# Patient Record
Sex: FEMALE | Race: WHITE | NOT HISPANIC OR LATINO | Employment: OTHER | URBAN - METROPOLITAN AREA
[De-identification: names, ages, dates, MRNs, and addresses within clinical notes are randomized per-mention and may not be internally consistent; named-entity substitution may affect disease eponyms.]

---

## 2019-03-21 ENCOUNTER — RECORDS - HEALTHEAST (OUTPATIENT)
Dept: LAB | Facility: CLINIC | Age: 64
End: 2019-03-21

## 2019-03-22 LAB
ALBUMIN SERPL-MCNC: 3.4 G/DL (ref 3.5–5)
ALP SERPL-CCNC: 103 U/L (ref 45–120)
ALT SERPL W P-5'-P-CCNC: 15 U/L (ref 0–45)
ANION GAP SERPL CALCULATED.3IONS-SCNC: 10 MMOL/L (ref 5–18)
AST SERPL W P-5'-P-CCNC: 13 U/L (ref 0–40)
BILIRUB SERPL-MCNC: 0.3 MG/DL (ref 0–1)
BUN SERPL-MCNC: 14 MG/DL (ref 8–22)
CALCIUM SERPL-MCNC: 8.9 MG/DL (ref 8.5–10.5)
CHLORIDE BLD-SCNC: 104 MMOL/L (ref 98–107)
CO2 SERPL-SCNC: 24 MMOL/L (ref 22–31)
CREAT SERPL-MCNC: 0.77 MG/DL (ref 0.6–1.1)
GFR SERPL CREATININE-BSD FRML MDRD: >60 ML/MIN/1.73M2
GLUCOSE BLD-MCNC: 121 MG/DL (ref 70–125)
POTASSIUM BLD-SCNC: 3.7 MMOL/L (ref 3.5–5)
PROT SERPL-MCNC: 6.5 G/DL (ref 6–8)
SODIUM SERPL-SCNC: 138 MMOL/L (ref 136–145)

## 2020-03-17 ENCOUNTER — ANCILLARY PROCEDURE (OUTPATIENT)
Dept: GENERAL RADIOLOGY | Facility: CLINIC | Age: 65
End: 2020-03-17
Attending: PHYSICIAN ASSISTANT
Payer: COMMERCIAL

## 2020-03-17 ENCOUNTER — OFFICE VISIT (OUTPATIENT)
Dept: URGENT CARE | Facility: URGENT CARE | Age: 65
End: 2020-03-17
Payer: COMMERCIAL

## 2020-03-17 VITALS
SYSTOLIC BLOOD PRESSURE: 164 MMHG | OXYGEN SATURATION: 97 % | TEMPERATURE: 97 F | HEART RATE: 76 BPM | WEIGHT: 246 LBS | DIASTOLIC BLOOD PRESSURE: 96 MMHG

## 2020-03-17 DIAGNOSIS — R07.1 CHEST PAIN ON BREATHING: ICD-10-CM

## 2020-03-17 DIAGNOSIS — R07.1 CHEST PAIN ON BREATHING: Primary | ICD-10-CM

## 2020-03-17 PROCEDURE — 71046 X-RAY EXAM CHEST 2 VIEWS: CPT

## 2020-03-17 PROCEDURE — 99205 OFFICE O/P NEW HI 60 MIN: CPT | Performed by: PHYSICIAN ASSISTANT

## 2020-03-17 PROCEDURE — 93000 ELECTROCARDIOGRAM COMPLETE: CPT | Performed by: PHYSICIAN ASSISTANT

## 2020-03-18 NOTE — PROGRESS NOTES
SUBJECTIVE:  Serene Carcamo is a 64 year old female who presents to the office with the CC of chest pain.  Patient complains of chest pain and shortness of breath for 2 days.  The pain is characterized as moderate and localized pressure and stabbing located left chest with radiation to none. Symptoms began 2 day(s) ago, still present  Pain is associated with SOB.  Pain is exacerbated by no known provoking events.  Pain is relieved by none.  Cardiac risk factors: hypertension, history of angina    Albuterol and ibuprofen are not helping    History of asthma, pleurisy, angina    Family history positive for asthma, CV disease    Nonsmoker       Allergies   Allergen Reactions     Amitriptyline          ROS:  10 point ROS negative except as listed above      EXAM:  BP (!) 164/96   Pulse 76   Temp 97  F (36.1  C)   Wt 111.6 kg (246 lb)   SpO2 97%   GENERAL APPEARANCE: healthy, alert and no distress  EYES: conjunctiva clear  HENT: no cyanosis in lips  NECK: supple, nontender, no lymphadenopathy  RESP: lungs clear to auscultation - no rales, rhonchi or wheezes  CV: regular rates and rhythm, normal S1 S2, no murmur noted  NEURO: Normal strength and tone, sensory exam grossly normal,  normal speech and mentation  SKIN: no suspicious lesions or rashes     EKG: SINUS    CXR: X-ray image initially interpreted in clinic by me in order to rule out pneumonia.  No evidence appreciated.    IMPRESSION  (R07.1) Chest pain on breathing  (primary encounter diagnosis)  Comment: history of angina and pleurisy.  No regular doctor  Plan: EKG 12-lead complete w/read - Clinics, XR Chest        2 Views    PT DECLINES AMBULANCE AMA    SENT TO ER BY PRIVATE VEHICLE DRIVEN BY SISTER

## 2020-03-19 ENCOUNTER — COMMUNICATION - HEALTHEAST (OUTPATIENT)
Dept: CARDIOLOGY | Facility: CLINIC | Age: 65
End: 2020-03-19

## 2020-03-20 ENCOUNTER — OFFICE VISIT - HEALTHEAST (OUTPATIENT)
Dept: CARDIOLOGY | Facility: CLINIC | Age: 65
End: 2020-03-20

## 2020-03-20 DIAGNOSIS — R07.9 CHEST PAIN, UNSPECIFIED TYPE: ICD-10-CM

## 2020-03-20 DIAGNOSIS — I10 ESSENTIAL HYPERTENSION: ICD-10-CM

## 2021-05-25 ENCOUNTER — RECORDS - HEALTHEAST (OUTPATIENT)
Dept: ADMINISTRATIVE | Facility: CLINIC | Age: 66
End: 2021-05-25

## 2021-05-27 ENCOUNTER — RECORDS - HEALTHEAST (OUTPATIENT)
Dept: ADMINISTRATIVE | Facility: CLINIC | Age: 66
End: 2021-05-27

## 2021-05-30 ENCOUNTER — RECORDS - HEALTHEAST (OUTPATIENT)
Dept: ADMINISTRATIVE | Facility: CLINIC | Age: 66
End: 2021-05-30

## 2021-06-04 VITALS — DIASTOLIC BLOOD PRESSURE: 78 MMHG | BODY MASS INDEX: 42.43 KG/M2 | WEIGHT: 232 LBS | SYSTOLIC BLOOD PRESSURE: 168 MMHG

## 2021-06-07 NOTE — PROGRESS NOTES
Review of Systems - History obtained from the patient  General ROS: positive for  - weight loss  Psychological ROS: positive for - depression and anxiety   Ophthalmic ROS: negative  ENT ROS: negative  Allergy and Immunology ROS: negative  Hematological and Lymphatic ROS: negative  Endocrine ROS: negative  Respiratory ROS: positive for - shortness of breath  Cardiovascular ROS: negative  Gastrointestinal ROS: negative  Genito-Urinary ROS: negative  Musculoskeletal ROS: negative  Neurological ROS: negative  Dermatological ROS: negative  Per patient the hypertension is new for her.

## 2021-06-07 NOTE — PATIENT INSTRUCTIONS - HE
1.  Begin Maxide 1 tablet daily    2.  Follow-up with primary physician in 1 week for laboratory studies and recheck of blood pressure.

## 2021-06-07 NOTE — TELEPHONE ENCOUNTER
Wellness Screening Tool  Symptom Screening:  Do you have a:    Fever?  No    Cough?  Yes started yesterday.     Shortness of breath?  Yes   o If yes, is this a change? No    Skin rash?  No  Within the past 14 days, have you been in contact with someone who:    Is currently being ruled out for COVID-19 (novel coronavirus)?  No    Has tested positive for COVID-19?  No    Has symptoms of a respiratory illness (fever, cough, shortness of breath)?  No  Have you recently traveled to an area with COVID-19 areas:    Refer to the CDC Coronavirus webpage for COVID-19 areas:  No  Within the past 3 weeks, have you been exposed to the following:    Pertussis?  No    Chicken pox?  No    Measles? No  Patient's appointment status: patient will be seen in clinic as scheduled  Patient reminded that no visitors are allowed at this time and if their appointment is at [facility], there is no  serviced offered due to COVID-19 concerns.

## 2021-06-28 NOTE — PROGRESS NOTES
"Progress Notes by Evi Merida MD at 3/20/2020  4:20 PM     Author: Evi Merida MD Service: -- Author Type: Physician    Filed: 3/20/2020  4:18 PM Encounter Date: 3/20/2020 Status: Signed    : Evi Merida MD (Physician)               The patient has been notified of following:     \"This telephone visit will be conducted via a call between you and your physician/provider. We have found that certain health care needs can be provided without the need for a physical exam.  This service lets us provide the care you need with a phone conversation.  If a prescription is necessary we can send it directly to your pharmacy.  If lab work is needed we can place an order for that and you can then stop by our lab to have the test done at a later time. If during the course of the call the physician/provider feels a telephone visit is not appropriate, you will not be charged for this service.\" Verbal consent has been obtained for this service by care team member:         HEART CARE PHONE ENCOUNTER        The patient has chosen to have the visit conducted as a telephone visit, to reduce risk of exposure given the current status of Coronavirus in our community. This telephone visit is being conducted via a call between the patient and physician/provider. Health care needs are being provided without a physical exam.     Assessment/Recommendations   Assessment:    1. Chest discomfort, possibly related to elevated blood pressure/stress as the patient has lost 7 family members or friends over the last 6 months.  She also is likely going to be out of work due to all the issues going on currently.  She did take 1 of her ex-'s sublingual nitroglycerin after leaving the ER with some improvement in chest discomfort as well as improvement in blood pressure.  I did suggest beginning low-dose diuretic therapy to see if this helps with her blood pressure.  Also recommended a stress test; however, the patient states she would not " be able to afford it currently.  Did strongly encourage her to follow-up with her primary physician.    Plan:  1.  Patient to begin Maxide triamterene 75/50 mg daily.  2.  Advised follow-up with primary physician in 1 week to check electrolytes and blood pressure  3.  Consider pharmacologic nuclear stress test although the patient states she would not be able to afford it.      Follow Up Plan: Follow up in   I have reviewed the note as documented.  This accurately captures the substance of my conversation with the patient.    Total time of call between patient and provider was 22 minutes   Start Time:1550  Stop Time:1612       History of Present Illness/Subjective    Serene Carcamo is a 64 y.o. female who is being evaluated via a billable telephone visit.      This patient is a 64-year-old female with history of asthma but no prior history of cardiac disease who was seen in the ED recently for evaluation of chest discomfort.  Initially felt her symptoms were most likely due to pleurisy which she has had in the past although they noted her blood pressure to be quite elevated at the time of arrival.  She does admit to being under significant amount of stress having recently lost 7 members of her family or close friends over the last 6 months.  In talking about it, she became quite tearful and thinks this may be the reason her blood pressure is running high as she normally has a blood pressure of 120/70.  She also tells me she will be losing her job after today because of the issues going on currently.  She is also in the process of moving to [place].  No prior history of exertional chest discomfort.  She does admit to some mild exertional dyspnea which she attributes to her weight.  No history of orthopnea, PND or lower extremity edema.  She did have a troponin and ECG which were negative.  Chest x-ray demonstrated no acute pulmonary process.  Because of elevated d-dimer, CT PE run was performed which was negative  for pulmonary emboli but suggested possible mild heart failure.  She was subsequently discharged to be seen in rapid access clinic.    I have reviewed and updated the patient's Past Medical History, Social History, Family History and Medication List.     Physical Examination not performed given phone encounter Review of Systems             Please see end of note.                                   Medical History  Surgical History Family History Social History   Past Medical History:   Diagnosis Date   ? Asthma     Past Surgical History:   Procedure Laterality Date   ?  SECTION     ? CHOLECYSTECTOMY     ? ME TOTAL ABDOM HYSTERECTOMY      Description: Total Abdominal Hysterectomy;  Recorded: 2013;   ? SKIN CANCER EXCISION      Family History   Problem Relation Age of Onset   ? Stroke Sister    ? Heart disease Father 87   ? Cancer Mother         unknown primary   ? Asthma Brother    - CAD           Brother 69  [place]           Sister Social History     Socioeconomic History   ? Marital status:      Spouse name: Not on file   ? Number of children: Not on file   ? Years of education: Not on file   ? Highest education level: Not on file   Occupational History   ? Not on file   Social Needs   ? Financial resource strain: Not on file   ? Food insecurity     Worry: Not on file     Inability: Not on file   ? Transportation needs     Medical: Not on file     Non-medical: Not on file   Tobacco Use   ? Smoking status: Never Smoker   ? Smokeless tobacco: Never Used   Substance and Sexual Activity   ? Alcohol use: No   ? Drug use: No   ? Sexual activity: Not Currently   Lifestyle   ? Physical activity     Days per week: Not on file     Minutes per session: Not on file   ? Stress: Not on file   Relationships   ? Social connections     Talks on phone: Not on file     Gets together: Not on file     Attends [employer] service: Not on file     Active member of club or organization: Not on file     Attends meetings  of clubs or organizations: Not on file     Relationship status: Not on file   ? Intimate partner violence     Fear of current or ex partner: Not on file     Emotionally abused: Not on file     Physically abused: Not on file     Forced sexual activity: Not on file   Other Topics Concern   ? Not on file   Social History Narrative    ** Merged History Encounter **               Medications  Allergies   Current Outpatient Medications   Medication Sig Dispense Refill   ? acetaminophen (TYLENOL) 500 MG tablet Take 1,000 mg by mouth every 6 (six) hours as needed for pain.     ? albuterol (PROVENTIL HFA;VENTOLIN HFA) 90 mcg/actuation inhaler Inhale 2 puffs every 6 (six) hours as needed for wheezing.     ? aspirin 325 MG tablet Take 325 mg by mouth daily.      ? naproxen sodium (ALEVE) 220 MG tablet Take 220 mg by mouth as needed for pain.       No current facility-administered medications for this visit.     Allergies   Allergen Reactions   ? Amitriptyline      OtherAnnotation: Unsure of reaction, was given when she was young     ? Codeine Nausea And Vomiting   ? Codeine Nausea Only         Lab Results    Chemistry/lipid CBC Cardiac Enzymes/BNP/TSH/INR   Lab Results   Component Value Date    CREATININE 0.72 03/17/2020    BUN 19 03/17/2020    K 3.8 03/17/2020     03/17/2020     (H) 03/17/2020    CO2 26 03/17/2020    Lab Results   Component Value Date    WBC 10.0 03/17/2020    HGB 10.3 (L) 03/17/2020    HCT 33.3 (L) 03/17/2020    MCV 81 03/17/2020     03/17/2020    Lab Results   Component Value Date    CKMB 1 01/28/2013    TROPONINI <0.01 03/17/2020     (H) 12/25/2014    TSH 1.99 12/26/2014    INR 0.96 03/03/2017        Evi Merida        Review of Systems - History obtained from the patient  General ROS: positive for  - weight loss  Psychological ROS: positive for - depression and anxiety   Ophthalmic ROS: negative  ENT ROS: negative  Allergy and Immunology ROS: negative  Hematological and Lymphatic  ROS: negative  Endocrine ROS: negative  Respiratory ROS: positive for - shortness of breath  Cardiovascular ROS: negative  Gastrointestinal ROS: negative  Genito-Urinary ROS: negative  Musculoskeletal ROS: negative  Neurological ROS: negative  Dermatological ROS: negative  Per patient the hypertension is new for her.

## 2021-07-25 ENCOUNTER — HEALTH MAINTENANCE LETTER (OUTPATIENT)
Age: 66
End: 2021-07-25

## 2021-09-19 ENCOUNTER — HEALTH MAINTENANCE LETTER (OUTPATIENT)
Age: 66
End: 2021-09-19

## 2022-08-21 ENCOUNTER — HEALTH MAINTENANCE LETTER (OUTPATIENT)
Age: 67
End: 2022-08-21

## 2022-09-12 ENCOUNTER — HOSPITAL ENCOUNTER (EMERGENCY)
Facility: HOSPITAL | Age: 67
Discharge: HOME | End: 2022-09-12
Attending: EMERGENCY MEDICINE
Payer: MEDICARE

## 2022-09-12 ENCOUNTER — APPOINTMENT (OUTPATIENT)
Dept: CT IMAGING | Facility: HOSPITAL | Age: 67
End: 2022-09-12
Payer: MEDICARE

## 2022-09-12 VITALS
HEIGHT: 62 IN | OXYGEN SATURATION: 98 % | TEMPERATURE: 97.7 F | SYSTOLIC BLOOD PRESSURE: 151 MMHG | HEART RATE: 70 BPM | WEIGHT: 239.64 LBS | BODY MASS INDEX: 44.1 KG/M2 | DIASTOLIC BLOOD PRESSURE: 75 MMHG | RESPIRATION RATE: 12 BRPM

## 2022-09-12 DIAGNOSIS — J98.01 BRONCHOSPASM: ICD-10-CM

## 2022-09-12 DIAGNOSIS — Z87.09 HISTORY OF ASTHMA: ICD-10-CM

## 2022-09-12 DIAGNOSIS — R05.9 COUGH: Primary | ICD-10-CM

## 2022-09-12 LAB
ALBUMIN SERPL-MCNC: 4.4 G/DL (ref 3.5–5.3)
ALP SERPL-CCNC: 121 U/L (ref 55–142)
ALT SERPL-CCNC: 16 U/L (ref 7–52)
ANION GAP SERPL CALC-SCNC: 9 MMOL/L (ref 3–11)
AST SERPL-CCNC: 17 U/L
B PARAP IS1001 DNA NPH QL NAA+NON-PROBE: NEGATIVE
B PERT.PT PRMT NPH QL NAA+NON-PROBE: NEGATIVE
BASOPHILS # BLD AUTO: 0.1 10*3/UL
BASOPHILS NFR BLD AUTO: 0.7 % (ref 0–2)
BILIRUB SERPL-MCNC: 0.35 MG/DL (ref 0.2–1.4)
BNP SERPL-MCNC: 36 PG/ML (ref 0–100)
BUN SERPL-MCNC: 22 MG/DL (ref 7–25)
C PNEUM DNA NPH QL NAA+NON-PROBE: NEGATIVE
CALCIUM ALBUM COR SERPL-MCNC: 9.5 MG/DL (ref 8.6–10.3)
CALCIUM SERPL-MCNC: 9.8 MG/DL (ref 8.6–10.3)
CHLORIDE SERPL-SCNC: 104 MMOL/L (ref 98–107)
CO2 SERPL-SCNC: 27 MMOL/L (ref 21–32)
CREAT SERPL-MCNC: 0.9 MG/DL (ref 0.6–1.1)
DELTA HIGH SENSITIVITY TROPONIN I, 1 HOUR: -0.1
DELTA HIGH SENSITIVITY TROPONIN I, 2 HOUR: 0.5
EOSINOPHIL # BLD AUTO: 0.1 10*3/UL
EOSINOPHIL NFR BLD AUTO: 1.4 % (ref 0–3)
ERYTHROCYTE [DISTWIDTH] IN BLOOD BY AUTOMATED COUNT: 16.3 % (ref 11.5–14)
FLUAV RNA NPH QL NAA+NON-PROBE: NEGATIVE
FLUBV RNA NPH QL NAA+NON-PROBE: NEGATIVE
GFR SERPL CREATININE-BSD FRML MDRD: 71 ML/MIN/1.73M*2
GLUCOSE SERPL-MCNC: 97 MG/DL (ref 70–105)
HADV DNA NPH QL NAA+NON-PROBE: NEGATIVE
HCOV 229E RNA NPH QL NAA+NON-PROBE: NEGATIVE
HCOV HKU1 RNA NPH QL NAA+NON-PROBE: NEGATIVE
HCOV NL63 RNA NPH QL NAA+NON-PROBE: NEGATIVE
HCOV OC43 RNA NPH QL NAA+NON-PROBE: NEGATIVE
HCT VFR BLD AUTO: 33.9 % (ref 34–45)
HGB BLD-MCNC: 11.1 G/DL (ref 11.5–15.5)
HMPV RNA NPH QL NAA+NON-PROBE: NEGATIVE
HPIV1 RNA NPH QL NAA+NON-PROBE: NEGATIVE
HPIV2 RNA NPH QL NAA+NON-PROBE: NEGATIVE
HPIV3 RNA NPH QL NAA+NON-PROBE: NEGATIVE
HPIV4 RNA NPH QL NAA+NON-PROBE: NEGATIVE
HYPOCHROMIA PRESENCE IN BLOOD, ANALYZER: ABNORMAL
LYMPHOCYTES # BLD AUTO: 1.8 10*3/UL
LYMPHOCYTES NFR BLD AUTO: 20.4 % (ref 11–47)
M PNEUMO DNA NPH QL NAA+NON-PROBE: NEGATIVE
MCH RBC QN AUTO: 26.2 PG (ref 28–33)
MCHC RBC AUTO-ENTMCNC: 32.7 G/DL (ref 32–36)
MCV RBC AUTO: 80.2 FL (ref 81–97)
MONOCYTES # BLD AUTO: 0.6 10*3/UL
MONOCYTES NFR BLD AUTO: 6.4 % (ref 3–11)
NEUTROPHILS # BLD AUTO: 6.2 10*3/UL
NEUTROPHILS NFR BLD AUTO: 71.1 % (ref 41–81)
PLATELET # BLD AUTO: 317 10*3/UL (ref 140–350)
PMV BLD AUTO: 9.3 FL (ref 6.9–10.8)
POTASSIUM SERPL-SCNC: 3.9 MMOL/L (ref 3.5–5.1)
PROT SERPL-MCNC: 7.6 G/DL (ref 6–8.3)
RBC # BLD AUTO: 4.22 10*6/ΜL (ref 3.7–5.3)
RSV RNA NPH QL NAA+NON-PROBE: NEGATIVE
RV+EV RNA NPH QL NAA+NON-PROBE: NEGATIVE
SARS-COV-2 RNA NPH QL NAA+NON-PROBE: NEGATIVE
SODIUM SERPL-SCNC: 140 MMOL/L (ref 135–145)
TROPONIN I SERPL-MCNC: 2.5 PG/ML
TROPONIN I SERPL-MCNC: 2.6 PG/ML
TROPONIN I SERPL-MCNC: 3.1 PG/ML
WBC # BLD AUTO: 8.7 10*3/UL (ref 4.5–10.5)

## 2022-09-12 PROCEDURE — 87633 RESP VIRUS 12-25 TARGETS: CPT | Performed by: NURSE PRACTITIONER

## 2022-09-12 PROCEDURE — 85025 COMPLETE CBC W/AUTO DIFF WBC: CPT | Performed by: NURSE PRACTITIONER

## 2022-09-12 PROCEDURE — 84484 ASSAY OF TROPONIN QUANT: CPT | Performed by: NURSE PRACTITIONER

## 2022-09-12 PROCEDURE — 93005 ELECTROCARDIOGRAM TRACING: CPT | Performed by: NURSE PRACTITIONER

## 2022-09-12 PROCEDURE — 71275 CT ANGIOGRAPHY CHEST: CPT | Mod: ME

## 2022-09-12 PROCEDURE — 83880 ASSAY OF NATRIURETIC PEPTIDE: CPT | Performed by: NURSE PRACTITIONER

## 2022-09-12 PROCEDURE — 6370000100 HC RX 637 (ALT 250 FOR IP): Performed by: NURSE PRACTITIONER

## 2022-09-12 PROCEDURE — 2550000100 HC RX 255: Performed by: NURSE PRACTITIONER

## 2022-09-12 PROCEDURE — 2580000300 HC RX 258: Performed by: NURSE PRACTITIONER

## 2022-09-12 PROCEDURE — 99285 EMERGENCY DEPT VISIT HI MDM: CPT | Performed by: EMERGENCY MEDICINE

## 2022-09-12 PROCEDURE — 80053 COMPREHEN METABOLIC PANEL: CPT | Performed by: NURSE PRACTITIONER

## 2022-09-12 PROCEDURE — 36415 COLL VENOUS BLD VENIPUNCTURE: CPT | Performed by: EMERGENCY MEDICINE

## 2022-09-12 RX ORDER — SODIUM CHLORIDE 9 MG/ML
500 INJECTION, SOLUTION INTRAVENOUS ONCE
Status: COMPLETED | OUTPATIENT
Start: 2022-09-12 | End: 2022-09-12

## 2022-09-12 RX ORDER — SODIUM CHLORIDE 9 MG/ML
25-50 INJECTION, SOLUTION INTRAVENOUS AS NEEDED
Status: DISCONTINUED | OUTPATIENT
Start: 2022-09-12 | End: 2022-09-12 | Stop reason: HOSPADM

## 2022-09-12 RX ORDER — IOPAMIDOL 755 MG/ML
80 INJECTION, SOLUTION INTRAVASCULAR ONCE
Status: COMPLETED | OUTPATIENT
Start: 2022-09-12 | End: 2022-09-12

## 2022-09-12 RX ADMIN — SODIUM CHLORIDE 500 ML: 9 INJECTION, SOLUTION INTRAVENOUS at 11:05

## 2022-09-12 RX ADMIN — IOPAMIDOL 80 ML: 755 INJECTION, SOLUTION INTRAVENOUS at 12:05

## 2022-09-12 RX ADMIN — IPRATROPIUM BROMIDE AND ALBUTEROL 1 PUFF: 20; 100 SPRAY, METERED RESPIRATORY (INHALATION) at 13:14

## 2022-09-12 ASSESSMENT — ENCOUNTER SYMPTOMS
VOICE CHANGE: 0
BACK PAIN: 0
TROUBLE SWALLOWING: 0
VOMITING: 0
HEMATURIA: 0
ARTHRALGIAS: 0
FATIGUE: 1
PALPITATIONS: 1
ACTIVITY CHANGE: 1
ABDOMINAL PAIN: 0
WOUND: 0
DIZZINESS: 1
COUGH: 1
BRUISES/BLEEDS EASILY: 0
FEVER: 0
MYALGIAS: 0
SHORTNESS OF BREATH: 1
DYSURIA: 0
EYE PAIN: 0
SEIZURES: 0
JOINT SWELLING: 0
SORE THROAT: 0
CHILLS: 0
HEADACHES: 0
COLOR CHANGE: 0
NAUSEA: 0

## 2022-09-12 NOTE — DISCHARGE INSTRUCTIONS
No evidence of blood clot in the lungs, pneumonia, collapsed lung, fluid volume overload in the lungs electrolyte abnormality or evidence of heart ischemia.    Respiratory viral pathogen panel testing is pending for other viruses besides COVID.  If any positives we will call to notify.  Continue supportive cares including adequate hydration, adequate rest, balanced diet, albuterol inhaler 2 puffs every 4 hours as needed for shortness of breath or cough.    Combivent inhaler is being added 1 puff twice daily for the next week and then as needed or discussed with your primary care provider.

## 2022-09-12 NOTE — ED ATTESTATION NOTE
I personally saw and evaluated this patient. I discussed this management with the BRITTNEY, Annmarie Jacobo CNP, & reviewed the BRITTNEY notes and agree with the documentation. I performed the substantive portion of the history as documented by the BRITTNEY. On my encounter, patient is referred from urgent care with symptoms concerning for pulmonary embolism. Agree with nurse practitioner's presentation and plan for comprehensive cardiopulmonary evaluation including appropriate testing to rule out pulmonary embolism. Further recommendations as per BRITTNEY's plan including disposition.        ECG 12 lead    Date/Time: 9/12/2022 11:22 AM  Performed by: Agustni Daigle MD  Authorized by: Annmarie Jacobo CNP     ECG reviewed by ED Physician in the absence of a cardiologist: yes    Interpretation:     Interpretation: non-specific    Rate:     ECG rate:  60    ECG rate assessment: normal    Rhythm:     Rhythm: sinus rhythm    Ectopy:     Ectopy: PAC    QRS:     QRS axis:  Normal  ST segments:     ST segments:  Normal  T waves:     T waves: normal    Other findings:     Other findings comment:  Abnormal R wave progression      By signing my name, I, Rahat Styles attest that this documentation has been prepared under the direction and in the presence of Dr. Daigle 9/12/2022, 11:22 AM.     Agustin Daigle MD  09/12/22 [id number]

## 2022-09-12 NOTE — ED PROVIDER NOTES
HPI:  Chief Complaint   Patient presents with    Cough     Pt presents w/ reports of going to see urgent care today and was reported to have been referred here for PE rule out.       66-year-old female presents via POV from [facility] urgent care.  She presented there today after multiple episodes of shortness of breath accompanied with palpitations and extreme fatigue.  Patient is traveling via car from [place].  2 days she has had episodes of sudden onset shortness of breath accompanied with palpitations that lasted few seconds and fleeting dizziness.  She is then able to rest but becomes extremely fatigued and is forced to lie down to take a nap.  Upon waking she feels better however symptoms return with any exertion.  Does have some worsening dyspnea and orthopnea at times.  Patient has a significant history of asthma.  During 1 of these episodes home pulse oximeter was 88-89% on room air.  She is also had a cough for 2 days.  Denies any infectious symptoms, syncopal episode, nausea, vomiting, diarrhea.  Denies any lower extremity pain or edema that is new or different than chronic.  Patient does have a significant history of hypertension and is on a water pill.  Reports stress test and echocardiogram were reported to be normal approximately 1 year ago.  Patient does not smoke.  No history of DVT or PE.  Does not take any hormones.  No history of cancer.  No hemoptysis.  No other associated symptoms or other identified aggravating factors.  Patient was seen at [facility] urgent care where chest x-ray was read as negative for acute abnormality.  D-dimer was mildly elevated at 920 and referred to this emergency department for further evaluation and management.      History provided by:  Patient  Cough  Cough characteristics:  Non-productive  Associated symptoms: shortness of breath    Associated symptoms: no chest pain, no chills, no ear pain, no fever, no headaches, no myalgias, no rash and no sore throat       HISTORY:  History reviewed. No pertinent past medical history.    History reviewed. No pertinent surgical history.    History reviewed. No pertinent family history.           ROS:  Review of Systems   Constitutional:  Positive for activity change and fatigue. Negative for chills and fever.   HENT:  Positive for congestion. Negative for ear pain, sore throat, trouble swallowing and voice change.    Eyes:  Negative for pain and visual disturbance.   Respiratory:  Positive for cough and shortness of breath.    Cardiovascular:  Positive for palpitations. Negative for chest pain and leg swelling.   Gastrointestinal:  Negative for abdominal pain, nausea and vomiting.   Genitourinary:  Negative for dysuria and hematuria.   Musculoskeletal:  Negative for arthralgias, back pain, joint swelling and myalgias.   Skin:  Negative for color change, rash and wound.   Neurological:  Positive for dizziness. Negative for seizures, syncope and headaches.   Hematological:  Does not bruise/bleed easily.   All other systems reviewed and are negative.    PE:  ED Triage Vitals   Temp Heart Rate Resp BP SpO2   09/12/22 0959 09/12/22 0959 09/12/22 0959 09/12/22 0959 09/12/22 0959   36.9 °C (98.4 °F) 76 14 (!) 148/102 98 %      Mean BP (mmHg) Temp Source Heart Rate Source Patient Position BP Location   09/12/22 1007 09/12/22 1007 -- 09/12/22 0959 --   122 Oral  Sitting       FiO2 (%)       --                  Physical Exam  Vitals and nursing note reviewed.   Constitutional:       General: She is in acute distress.      Appearance: She is well-developed. She is obese. She is not ill-appearing.   HENT:      Head: Normocephalic and atraumatic.      Right Ear: Tympanic membrane normal.      Left Ear: Tympanic membrane normal.      Nose: Nose normal.      Mouth/Throat:      Mouth: Mucous membranes are moist.   Eyes:      Extraocular Movements: Extraocular movements intact.      Conjunctiva/sclera: Conjunctivae normal.   Cardiovascular:       Rate and Rhythm: Normal rate and regular rhythm.      Pulses: Normal pulses.      Heart sounds: No murmur heard.  Pulmonary:      Effort: Pulmonary effort is normal. No respiratory distress.      Breath sounds: Normal breath sounds.   Abdominal:      General: Abdomen is flat. Bowel sounds are normal.      Palpations: Abdomen is soft.      Tenderness: There is no abdominal tenderness.   Musculoskeletal:         General: Normal range of motion.      Cervical back: Normal range of motion and neck supple.   Skin:     General: Skin is warm and dry.      Capillary Refill: Capillary refill takes less than 2 seconds.   Neurological:      Mental Status: She is alert and oriented to person, place, and time.   Psychiatric:         Mood and Affect: Mood normal.       ED LABS:  Labs Reviewed   CBC WITH AUTO DIFFERENTIAL - Abnormal       Result Value    WBC 8.7      RBC 4.22      Hemoglobin 11.1 (*)     Hematocrit 33.9 (*)     MCV 80.2 (*)     MCH 26.2 (*)     MCHC 32.7      RDW 16.3 (*)     Platelets 317      MPV 9.3      Neutrophils% 71.1      Lymphocytes% 20.4      Monocytes% 6.4      Eosinophils% 1.4      Basophils% 0.7      ANC (auto diff) 6.20      Lymphocytes Absolute 1.80      Monocytes Absolute 0.60      Eosinophils Absolute 0.10      Basophils Absolute 0.10      Hypochromia 1+     COMPREHENSIVE METABOLIC PANEL - Normal    Sodium 140      Potassium 3.9      Chloride 104      CO2 27      Anion Gap 9      BUN 22      Creatinine 0.90      Glucose 97      Calcium 9.8      AST 17      ALT (SGPT) 16      Alkaline Phosphatase 121      Total Protein 7.6      Albumin 4.4      Total Bilirubin 0.35      Corrected Calcium 9.5      eGFR 71      Narrative:     Calculation based on the 2021 Chronic Kidney Disease Epidemiology Collaboration (CKD-EPI) equation refit without adjustment for race.   B-TYPE NATRIURETIC PEPTIDE (BNP) - Normal    BNP 36     HIGH SENSITIVE TROPONIN I - Normal    hsTnI 0 Hour 2.6     RESPIRATORY PATHOGEN PANEL,  PCR - Normal    Adenovirus Detection by PCR Negative      SARS-COV-2 PCR Negative      Human Metapneumovirus Detection by PCR Negative      Rhinovirus/Enterovirus Detection by PCR Negative      Influenza A  Detection by PCR Negative      Influenza B Detection by PCR Negative      Respiratory Syncytial Virus Detection by PCR Negative      Bordetella Pertussis Detection by PCR Negative      Chlamydophila Pneumoniae Detection by PCR Negative      Mycoplasma pneumo by PCR Negative      Bordetella Parapertussis Detection by PCR Negative      Coronavirus 229E Detection by PCR Negative      Coronavirus HKU1 Detection by PCR Negative      Coronavirus NL63  Detection by PCR Negative      Coronavirus OC43 Detection by PCR Negative      Parainfluenza 1 Detection by PCR Negative      Parainfluenza 2 Detection by PCR Negative      Parainfluenza 3 Detection by PCR Negative      Parainfluenza 4 Detection by PCR Negative      Narrative:     This assay is performed using the FilmArray Respiratory Panel ([employer] Results from this test must be correlated with the clinical history, epidemiological data, and other data available to the clinician evaluating the patient. A negative FilmArray RP result does not exclude the possibility of viral or bacterial infection. Negative test results may occur from the presence of sequence variants in the region targeted by the assay, the presence of inhibitors or an infection caused by an organism not detected by thepanel.   HIGH SENSITIVE TROPONIN I, 1 HOUR - Normal    hsTnI 1 hr 2.5      Delta from 0 Hour -0.1     HIGH SENSITIVE TROPONIN I, 2 HOUR - Normal    Delta from 0 Hour 0.5      hsTnI 2 hr 3.1     HIGH SENSITIVE TROPONIN I PANEL (0HR, 1HR, 2HR)    Narrative:     The following orders were created for panel order HS Troponin I Panel (0HR, 1HR, 2HR) Blood, Venous.  Procedure                               Abnormality         Status                     ---------                                -----------         ------                     HS Troponin I[[id number]]                 Normal              Final result               1HR High Sensitive Trop I[[id number]]     Normal              Final result               2HR High Sensitive Tropon...[[id number]]  Normal              Final result                 Please view results for these tests on the individual orders.   LAVENDER TOP RAINBOW   URINE EXTRA CONTAINER         ED IMAGES:  CT angiogram chest PE with IV contrast   Final Result   IMPRESSION:      Negative for pulmonary emboli or other acute abnormality.          ED PROCEDURES:  Procedures    ED COURSE:  ED Course as of 09/12/22 1911   Mon Sep 12, 2022   1210 Ambulation trial does not demonstrate hypoxia.  CT imaging negative for PE or other abnormality.  I have reviewed and agree.  BNP is negative for concern of heart failure.  Troponin is negative x2 therefore low suspicion cardiac etiology.  Patient will be discharged home with Combivent for bronchospasm to be used on a scheduled basis for the next week and then as needed.  Should follow with primary care provider upon return home to [place].  States understanding is agreeable to this plan. [AJ]      ED Course User Index  [AJ] Annmarie Jacobo CNP          Sepsis Quality Bundle         MDM:  MDM     Amount and/or Complexity of Data Reviewed  Clinical lab tests: reviewed  Tests in the radiology section of CPT®: reviewed  Tests in the medicine section of CPT®: reviewed  Independent visualization of images, tracings, or specimens: yes      66-year-old female with history of asthma presents with cough, fatigue and shortness of breath.  She was seen by urgent care where D-dimer was significantly elevated was referred to this emergency department with concern for pulmonary embolism.  Baseline labs will be repeated to rule out cardiac ischemia, heart failure, NAOMIE, respiratory  pathogen panel will be obtained to rule out viral illness.   Continuous pulse oximetry and telemetry will be monitored.  CTA will be obtained due to elevated D-dimer, recent travel dyspnea on exertion.  Symptomatic treatment will be provided.    A voice recognition program was used to aid in documentation of this record.  Sometimes words are not printed exactly as they were spoken.  While efforts were made to carefully edit and correct any inaccuracies, some areas may be present; please take these into context.  Please contact provider if areas are identified or any concerns.   Final diagnoses:   [R05.9] Cough   [J98.01] Bronchospasm   [Z87.09] History of asthma     9/12/2022  1:02 PM                   Annmarie Jacobo CNP  09/12/22 0762

## 2022-11-21 ENCOUNTER — HEALTH MAINTENANCE LETTER (OUTPATIENT)
Age: 67
End: 2022-11-21

## 2023-04-15 ENCOUNTER — HOSPITAL ENCOUNTER (EMERGENCY)
Facility: CLINIC | Age: 68
Discharge: HOME | End: 2023-04-15
Attending: EMERGENCY MEDICINE | Admitting: EMERGENCY MEDICINE
Payer: MEDICARE

## 2023-04-15 ENCOUNTER — APPOINTMENT (OUTPATIENT)
Dept: CT IMAGING | Facility: CLINIC | Age: 68
End: 2023-04-15
Attending: EMERGENCY MEDICINE
Payer: MEDICARE

## 2023-04-15 VITALS
BODY MASS INDEX: 41.13 KG/M2 | DIASTOLIC BLOOD PRESSURE: 63 MMHG | HEIGHT: 62 IN | RESPIRATION RATE: 16 BRPM | TEMPERATURE: 97.8 F | OXYGEN SATURATION: 95 % | WEIGHT: 223.5 LBS | SYSTOLIC BLOOD PRESSURE: 131 MMHG | HEART RATE: 83 BPM

## 2023-04-15 DIAGNOSIS — R10.13 ABDOMINAL PAIN, EPIGASTRIC: ICD-10-CM

## 2023-04-15 LAB
ALBUMIN SERPL-MCNC: 3.5 G/DL (ref 3.5–5)
ALP SERPL-CCNC: 98 U/L (ref 45–120)
ALT SERPL W P-5'-P-CCNC: 33 U/L (ref 0–45)
ANION GAP SERPL CALCULATED.3IONS-SCNC: 8 MMOL/L (ref 5–18)
AST SERPL W P-5'-P-CCNC: 16 U/L (ref 0–40)
BILIRUB SERPL-MCNC: 0.5 MG/DL (ref 0–1)
BUN SERPL-MCNC: 16 MG/DL (ref 8–22)
CALCIUM SERPL-MCNC: 9.2 MG/DL (ref 8.5–10.5)
CHLORIDE BLD-SCNC: 106 MMOL/L (ref 98–107)
CO2 SERPL-SCNC: 24 MMOL/L (ref 22–31)
CREAT SERPL-MCNC: 0.93 MG/DL (ref 0.6–1.1)
ERYTHROCYTE [DISTWIDTH] IN BLOOD BY AUTOMATED COUNT: 14.8 % (ref 10–15)
GFR SERPL CREATININE-BSD FRML MDRD: 67 ML/MIN/1.73M2
GLUCOSE BLD-MCNC: 126 MG/DL (ref 70–125)
HCT VFR BLD AUTO: 36 % (ref 35–47)
HGB BLD-MCNC: 11.7 G/DL (ref 11.7–15.7)
LIPASE SERPL-CCNC: 31 U/L (ref 0–52)
MCH RBC QN AUTO: 26.7 PG (ref 26.5–33)
MCHC RBC AUTO-ENTMCNC: 32.5 G/DL (ref 31.5–36.5)
MCV RBC AUTO: 82 FL (ref 78–100)
PLATELET # BLD AUTO: 334 10E3/UL (ref 150–450)
POTASSIUM BLD-SCNC: 3.5 MMOL/L (ref 3.5–5)
PROT SERPL-MCNC: 6.6 G/DL (ref 6–8)
RBC # BLD AUTO: 4.38 10E6/UL (ref 3.8–5.2)
SODIUM SERPL-SCNC: 138 MMOL/L (ref 136–145)
WBC # BLD AUTO: 9.1 10E3/UL (ref 4–11)

## 2023-04-15 PROCEDURE — 250N000011 HC RX IP 250 OP 636: Performed by: EMERGENCY MEDICINE

## 2023-04-15 PROCEDURE — 85027 COMPLETE CBC AUTOMATED: CPT | Performed by: EMERGENCY MEDICINE

## 2023-04-15 PROCEDURE — 99285 EMERGENCY DEPT VISIT HI MDM: CPT | Mod: 25

## 2023-04-15 PROCEDURE — G1010 CDSM STANSON: HCPCS

## 2023-04-15 PROCEDURE — 96375 TX/PRO/DX INJ NEW DRUG ADDON: CPT

## 2023-04-15 PROCEDURE — 83690 ASSAY OF LIPASE: CPT | Performed by: EMERGENCY MEDICINE

## 2023-04-15 PROCEDURE — 80053 COMPREHEN METABOLIC PANEL: CPT | Performed by: EMERGENCY MEDICINE

## 2023-04-15 PROCEDURE — 96374 THER/PROPH/DIAG INJ IV PUSH: CPT | Mod: 59

## 2023-04-15 PROCEDURE — 36415 COLL VENOUS BLD VENIPUNCTURE: CPT | Performed by: EMERGENCY MEDICINE

## 2023-04-15 RX ORDER — IOPAMIDOL 755 MG/ML
100 INJECTION, SOLUTION INTRAVASCULAR ONCE
Status: COMPLETED | OUTPATIENT
Start: 2023-04-15 | End: 2023-04-15

## 2023-04-15 RX ORDER — FAMOTIDINE 20 MG/1
20 TABLET, FILM COATED ORAL 2 TIMES DAILY
Qty: 40 TABLET | Refills: 0 | Status: SHIPPED | OUTPATIENT
Start: 2023-04-15

## 2023-04-15 RX ORDER — METOCLOPRAMIDE 10 MG/1
10 TABLET ORAL
Qty: 40 TABLET | Refills: 0 | Status: SHIPPED | OUTPATIENT
Start: 2023-04-15

## 2023-04-15 RX ORDER — METOCLOPRAMIDE HYDROCHLORIDE 5 MG/ML
10 INJECTION INTRAMUSCULAR; INTRAVENOUS ONCE
Status: COMPLETED | OUTPATIENT
Start: 2023-04-15 | End: 2023-04-15

## 2023-04-15 RX ADMIN — IOPAMIDOL 100 ML: 755 INJECTION, SOLUTION INTRAVENOUS at 11:00

## 2023-04-15 RX ADMIN — FAMOTIDINE 20 MG: 10 INJECTION, SOLUTION INTRAVENOUS at 10:23

## 2023-04-15 RX ADMIN — METOCLOPRAMIDE HYDROCHLORIDE 10 MG: 5 INJECTION INTRAMUSCULAR; INTRAVENOUS at 10:19

## 2023-04-15 ASSESSMENT — ACTIVITIES OF DAILY LIVING (ADL): ADLS_ACUITY_SCORE: 35

## 2023-04-15 NOTE — ED PROVIDER NOTES
EMERGENCY DEPARTMENT ENCOUNTER      NAME: Serene Carcamo  AGE: 67 year old female  YOB: 1955  MRN: [id number]  EVALUATION DATE & TIME: No admission date for patient encounter.    PCP: Rafa Garza    ED PROVIDER: Basil Mendoza M.D.      Chief Complaint   Patient presents with     Abdominal Pain         FINAL IMPRESSION:  Abdominal pain  History of gastric bypass    ED COURSE & MEDICAL DECISION MAKING:    Pertinent Labs & Imaging studies reviewed. (See chart for details)  67 year old female presents to the Emergency Department for evaluation of abdominal pain and early satiety.  Symptoms been ongoing for several days.  Had a plain films of her abdomen a few days ago and reported as normal.  Has been taking MiraLAX but does not report obvious constipation.  Patient has had prior cholecystectomy and gastric bypass surgery.  She localizes much of the discomfort in the right upper quadrant.  Describes as an achiness especially comes on after eating.  On exam she is obese female in mild distress.  Quite anxious about her symptoms.  Heart and lungs are unremarkable.  Abdomen is soft with moderate right upper quadrant tenderness.  Primary concern is potential stricture at site of anastomosis versus severe gastritis with gastric outlet obstruction issues.  We will treat symptomatically with Reglan and Pepcid.  Baseline blood work being obtained along with CT imaging.. Patient appears non toxic with stable vitals signs.     9:57 AM I met with the patient for the initial interview and physical examination. Discussed plan for treatment and workup in the ED.    11 AM.  Lipase is normal.  Comprehensive metabolic panel is normal other than insignificant elevation of glucose.  CBC unremarkable.  Awaiting CT imaging  11:30 AM.  Review of CT indicates gastric bypass.  No evidence of inflammatory process.  No evidence of obvious obstructive process.  Awaiting definitive report from radiology.  11:57 AM.   CT imaging without acute findings to explain her symptoms.  No evidence of obstructive or inflammatory process.  Patient will be placed on Reglan and Pepcid for symptomatic relief.  Recommendation follow-up primary care physician in 2 to 3 days for reevaluation if not improving.  Patient declines prescription for famotidine as she is started on Protonix a few days ago.  At the conclusion of the encounter I discussed the results of all of the tests and the disposition. The questions were answered and return precautions provided. The patient or family acknowledged understanding and was agreeable with the care plan.       PPE: Provider wore gloves, N95 mask, eye protection.     Medical Decision Making    History:    Supplemental history from: Documented in chart, if applicable    External Record(s) reviewed: Documented in chart, if applicable.    Work Up:    Chart documentation includes differential considered and any EKGs or imaging independently interpreted by provider, where specified.    In additional to work up documented, I considered the following work up: Documented in chart, if applicable.    External consultation:    Discussion of management with another provider: Documented in chart, if applicable    Complicating factors:    Care impacted by chronic illness: Other: Gastric bypass    Care affected by social determinants of health: N/A    Disposition considerations: Discharge. I prescribed additional prescription strength medication(s) as charted. I considered admission, but discharged patient after significant clinical improvement.    MEDICATIONS GIVEN IN THE EMERGENCY:  Medications - No data to display    NEW PRESCRIPTIONS STARTED AT TODAY'S ER VISIT  Current Discharge Medication List      START taking these medications    Details   famotidine (PEPCID) 20 MG tablet Take 1 tablet (20 mg) by mouth 2 times daily  Qty: 40 tablet, Refills: 0      metoclopramide (REGLAN) 10 MG tablet Take 1 tablet (10 mg) by mouth  "4 times daily (before meals and nightly)  Qty: 40 tablet, Refills: 0                =================================================================    HPI    Patient information was obtained from: Patient    Use of Intrepreter: N/A        Serene Carcamo is a 67 year old female with a pertient medical history of s/p cholecystectomy, s/p  x6, s/p hysterectomy, s/p gastric bypass who presents to the ED for evaluation of abdominal pain. For the past 2 weeks, patient endorses upper and right sided abdominal pain which she describes as a pressure sensation \"like something is pushing on it\". She also reports early satiety stating she is only able to take two bites of food before becoming full. Patient's symptoms are causing her anxiety.    She reports being previously evaluated 2 days ago for her symptoms and had an abdominal XR which she states she was told was normal. Patient notes she has heartburn with eating spicy foods, but she denies any worsening of her symptoms with eating certain foods. Patient has been taking Miralax for the past couple of weeks and endorses associated loose stools.      REVIEW OF SYSTEMS   Constitutional:  Denies fever, chills  Respiratory:  Denies productive cough or increased work of breathing  Cardiovascular:  Denies chest pain, palpitations  GI: Positive for abdominal pain (upper and right sided), early satiety.  Denies nausea, vomiting, or change in bowel or bladder habits   Musculoskeletal:  Denies any new muscle/joint swelling  Skin:  Denies rash   Neurologic:  Denies focal weakness  All systems negative except as marked.     PAST MEDICAL HISTORY:  Past Medical History:   Diagnosis Date     Asthma        PAST SURGICAL HISTORY:  Past Surgical History:   Procedure Laterality Date      SECTION       CHOLECYSTECTOMY       SKIN CANCER EXCISION       ZZC TOTAL ABDOM HYSTERECTOMY      Description: Total Abdominal Hysterectomy;  Recorded: 2013;         CURRENT " MEDICATIONS:    No current facility-administered medications for this encounter.  No current outpatient medications on file.    ALLERGIES:  Allergies   Allergen Reactions     Amitriptyline        FAMILY HISTORY:  Family History   Problem Relation Age of Onset     Cerebrovascular Disease Sister      Heart Disease Father 87.00     Cancer Mother         unknown primary     Asthma Brother        SOCIAL HISTORY:   Social History     Socioeconomic History     Marital status:      Spouse name: None     Number of children: None     Years of education: None     Highest education level: None   Tobacco Use     Smoking status: Never     Smokeless tobacco: Never   Substance and Sexual Activity     Alcohol use: No     Drug use: No     Sexual activity: Not Currently   Social History Narrative    ** Merged History Encounter **         VITALS:  No data found.     PHYSICAL EXAM    Constitutional:  Obese female, Awake, alert, in mild distress, anxious appearing  HENT:  Normocephalic, Atraumatic. Bilateral external ears normal. Oropharynx moist. Nose normal. Neck- Normal range of motion with no guarding, No midline cervical tenderness, Supple, No stridor.   Eyes:  PERRL, EOMI with no signs of entrapment, Conjunctiva normal, No discharge.   Respiratory:  Normal breath sounds, No respiratory distress, No wheezing.    Cardiovascular:  Normal heart rate, Normal rhythm, No appreciable rubs or gallops.   GI:  Soft, Epigastric and RUQ tenderness, No distension, No palpable masses  Musculoskeletal:  Intact distal pulses, No edema. Good range of motion in all major joints. No tenderness to palpation or major deformities noted.  Integument:  Warm, Dry, No erythema, No rash.   Neurologic:  Alert & oriented, Normal motor function, Normal sensory function, No focal deficits noted.   Psychiatric:  Anxious affect, Judgment normal, Mood normal.     LAB:  All pertinent labs reviewed and interpreted.     Results for orders placed or performed  during the hospital encounter of 04/15/23   CT Abdomen Pelvis w Contrast     Status: None    Narrative    EXAM: CT ABDOMEN PELVIS W CONTRAST  LOCATION: [facility]  DATE/TIME: 4/15/2023 11:11 AM CDT    INDICATION: Early satiety, right upper quadrant tenderness, history of gastric bypass  COMPARISON: None.  TECHNIQUE: CT scan of the abdomen and pelvis was performed following injection of IV contrast. Multiplanar reformats were obtained. Dose reduction techniques were used.  CONTRAST: 100ml Isovue 370    FINDINGS:   LOWER CHEST: Normal.    HEPATOBILIARY: Cholecystectomy. Low-attenuation lesion in the inferior liver likely represents a hemangioma, although technically indeterminate. Normal bile ducts.    PANCREAS: Normal.    SPLEEN: Normal.    ADRENAL GLANDS: Normal.    KIDNEYS/BLADDER: No hydronephrosis bilaterally. 2 mm stone in the mid left kidney. Normal ureters and bladder.    BOWEL: Normal with no obstruction or acute inflammatory change. Nothing for appendicitis. Gastric bypass changes with an intact anastomosis.    LYMPH NODES: Normal.    VASCULATURE: Unremarkable.    PELVIC ORGANS: Hysterectomy. No fluid.    MUSCULOSKELETAL: Degenerative change of the spine.      Impression    IMPRESSION:   1.  Intact gastric bypass changes with no signs of inflammation or obstruction.  2.  Cholecystectomy.  3.  Probable hemangioma in the inferior liver.  4.  Left-sided nephrolithiasis with no hydronephrosis.   Comprehensive metabolic panel     Status: Abnormal   Result Value Ref Range    Sodium 138 136 - 145 mmol/L    Potassium 3.5 3.5 - 5.0 mmol/L    Chloride 106 98 - 107 mmol/L    Carbon Dioxide (CO2) 24 22 - 31 mmol/L    Anion Gap 8 5 - 18 mmol/L    Urea Nitrogen 16 8 - 22 mg/dL    Creatinine 0.93 0.60 - 1.10 mg/dL    Calcium 9.2 8.5 - 10.5 mg/dL    Glucose 126 (H) 70 - 125 mg/dL    Alkaline Phosphatase 98 45 - 120 U/L    AST 16 0 - 40 U/L    ALT 33 0 - 45 U/L    Protein Total 6.6 6.0 - 8.0 g/dL     Albumin 3.5 3.5 - 5.0 g/dL    Bilirubin Total 0.5 0.0 - 1.0 mg/dL    GFR Estimate 67 >60 mL/min/1.73m2   Lipase     Status: Normal   Result Value Ref Range    Lipase 31 0 - 52 U/L   CBC (+ platelets, no diff)     Status: Normal   Result Value Ref Range    WBC Count 9.1 4.0 - 11.0 10e3/uL    RBC Count 4.38 3.80 - 5.20 10e6/uL    Hemoglobin 11.7 11.7 - 15.7 g/dL    Hematocrit 36.0 35.0 - 47.0 %    MCV 82 78 - 100 fL    MCH 26.7 26.5 - 33.0 pg    MCHC 32.5 31.5 - 36.5 g/dL    RDW 14.8 10.0 - 15.0 %    Platelet Count 334 150 - 450 10e3/uL         I, David Payne, am serving as a scribe to document services personally performed by Basil Mendoza MD, based on my observation and the provider's statements to me. I, Basil Mendoza MD attest that David Payne is acting in a scribe capacity, has observed my performance of the services and has documented them in accordance with my direction.    Basil Mendoza M.D.  Emergency Medicine  [facility] EMERGENCY ROOM     Basil Mendoza MD  04/15/23 1200       Basil Mendoza MD  04/15/23 1201       Basil Mendoza MD  04/15/23 1205

## 2023-04-15 NOTE — ED TRIAGE NOTES
Arrives to ED with c/o abd pain x2 weeks. Evaluated in [place] 2 days ago. Xrays done, negative. Hx of samira and gastric bypass. +N, no emesis. BM yesterday, soft. Taking fiber and softeners. Started on antidepressants 3 days ago. Reports decreased appetite.      Triage Assessment     Row Name 04/15/23 0945       Triage Assessment (Adult)    Airway WDL WDL       Respiratory WDL    Respiratory WDL WDL       Skin Circulation/Temperature WDL    Skin Circulation/Temperature WDL WDL       Cardiac WDL    Cardiac WDL WDL       Peripheral/Neurovascular WDL    Peripheral Neurovascular WDL WDL       Cognitive/Neuro/Behavioral WDL    Cognitive/Neuro/Behavioral WDL WDL

## 2023-09-16 ENCOUNTER — HEALTH MAINTENANCE LETTER (OUTPATIENT)
Age: 68
End: 2023-09-16

## 2024-11-09 ENCOUNTER — HEALTH MAINTENANCE LETTER (OUTPATIENT)
Age: 69
End: 2024-11-09